# Patient Record
Sex: FEMALE | Race: WHITE
[De-identification: names, ages, dates, MRNs, and addresses within clinical notes are randomized per-mention and may not be internally consistent; named-entity substitution may affect disease eponyms.]

---

## 2019-07-11 ENCOUNTER — HOSPITAL ENCOUNTER (EMERGENCY)
Dept: HOSPITAL 62 - ER | Age: 29
Discharge: HOME | End: 2019-07-11
Payer: COMMERCIAL

## 2019-07-11 VITALS — SYSTOLIC BLOOD PRESSURE: 135 MMHG | DIASTOLIC BLOOD PRESSURE: 78 MMHG

## 2019-07-11 DIAGNOSIS — R06.02: Primary | ICD-10-CM

## 2019-07-11 DIAGNOSIS — Z79.3: ICD-10-CM

## 2019-07-11 LAB
ADD MANUAL DIFF: NO
ALBUMIN SERPL-MCNC: 4.8 G/DL (ref 3.5–5)
ALP SERPL-CCNC: 60 U/L (ref 38–126)
ALT SERPL-CCNC: 19 U/L (ref 9–52)
ANION GAP SERPL CALC-SCNC: 9 MMOL/L (ref 5–19)
APPEARANCE UR: CLEAR
APTT PPP: YELLOW S
AST SERPL-CCNC: 23 U/L (ref 14–36)
BASOPHILS # BLD AUTO: 0 10^3/UL (ref 0–0.2)
BASOPHILS NFR BLD AUTO: 0.4 % (ref 0–2)
BILIRUB DIRECT SERPL-MCNC: 0.2 MG/DL (ref 0–0.4)
BILIRUB SERPL-MCNC: 0.5 MG/DL (ref 0.2–1.3)
BILIRUB UR QL STRIP: NEGATIVE
BUN SERPL-MCNC: 11 MG/DL (ref 7–20)
CALCIUM: 9.7 MG/DL (ref 8.4–10.2)
CHLORIDE SERPL-SCNC: 103 MMOL/L (ref 98–107)
CK MB SERPL-MCNC: < 0.22 NG/ML (ref ?–4.55)
CK SERPL-CCNC: 42 U/L (ref 30–135)
CO2 SERPL-SCNC: 28 MMOL/L (ref 22–30)
EOSINOPHIL # BLD AUTO: 0.1 10^3/UL (ref 0–0.6)
EOSINOPHIL NFR BLD AUTO: 0.9 % (ref 0–6)
ERYTHROCYTE [DISTWIDTH] IN BLOOD BY AUTOMATED COUNT: 12.8 % (ref 11.5–14)
GLUCOSE SERPL-MCNC: 93 MG/DL (ref 75–110)
GLUCOSE UR STRIP-MCNC: NEGATIVE MG/DL
HCT VFR BLD CALC: 40.4 % (ref 36–47)
HGB BLD-MCNC: 13.7 G/DL (ref 12–15.5)
KETONES UR STRIP-MCNC: NEGATIVE MG/DL
LIPASE SERPL-CCNC: 49.3 U/L (ref 23–300)
LYMPHOCYTES # BLD AUTO: 1 10^3/UL (ref 0.5–4.7)
LYMPHOCYTES NFR BLD AUTO: 13.6 % (ref 13–45)
MCH RBC QN AUTO: 29 PG (ref 27–33.4)
MCHC RBC AUTO-ENTMCNC: 34 G/DL (ref 32–36)
MCV RBC AUTO: 85 FL (ref 80–97)
MONOCYTES # BLD AUTO: 0.4 10^3/UL (ref 0.1–1.4)
MONOCYTES NFR BLD AUTO: 5.7 % (ref 3–13)
NEUTROPHILS # BLD AUTO: 5.8 10^3/UL (ref 1.7–8.2)
NEUTS SEG NFR BLD AUTO: 79.4 % (ref 42–78)
NITRITE UR QL STRIP: NEGATIVE
PH UR STRIP: 5 [PH] (ref 5–9)
PLATELET # BLD: 196 10^3/UL (ref 150–450)
POTASSIUM SERPL-SCNC: 4 MMOL/L (ref 3.6–5)
PROT SERPL-MCNC: 8.2 G/DL (ref 6.3–8.2)
PROT UR STRIP-MCNC: NEGATIVE MG/DL
RBC # BLD AUTO: 4.73 10^6/UL (ref 3.72–5.28)
SODIUM SERPL-SCNC: 140.3 MMOL/L (ref 137–145)
SP GR UR STRIP: 1.01
TOTAL CELLS COUNTED % (AUTO): 100 %
TROPONIN I SERPL-MCNC: < 0.012 NG/ML
UROBILINOGEN UR-MCNC: NEGATIVE MG/DL (ref ?–2)
WBC # BLD AUTO: 7.3 10^3/UL (ref 4–10.5)

## 2019-07-11 PROCEDURE — 71046 X-RAY EXAM CHEST 2 VIEWS: CPT

## 2019-07-11 PROCEDURE — 80053 COMPREHEN METABOLIC PANEL: CPT

## 2019-07-11 PROCEDURE — 99285 EMERGENCY DEPT VISIT HI MDM: CPT

## 2019-07-11 PROCEDURE — 84702 CHORIONIC GONADOTROPIN TEST: CPT

## 2019-07-11 PROCEDURE — 82553 CREATINE MB FRACTION: CPT

## 2019-07-11 PROCEDURE — 81001 URINALYSIS AUTO W/SCOPE: CPT

## 2019-07-11 PROCEDURE — 93010 ELECTROCARDIOGRAM REPORT: CPT

## 2019-07-11 PROCEDURE — 85025 COMPLETE CBC W/AUTO DIFF WBC: CPT

## 2019-07-11 PROCEDURE — 36415 COLL VENOUS BLD VENIPUNCTURE: CPT

## 2019-07-11 PROCEDURE — 93005 ELECTROCARDIOGRAM TRACING: CPT

## 2019-07-11 PROCEDURE — 82550 ASSAY OF CK (CPK): CPT

## 2019-07-11 PROCEDURE — 71275 CT ANGIOGRAPHY CHEST: CPT

## 2019-07-11 PROCEDURE — 84484 ASSAY OF TROPONIN QUANT: CPT

## 2019-07-11 PROCEDURE — 83690 ASSAY OF LIPASE: CPT

## 2019-07-11 NOTE — ER DOCUMENT REPORT
ED Respiratory Problem





- General


Chief Complaint: Shortness Of Breath


Stated Complaint: SHORTNESS OF BREATH


Time Seen by Provider: 07/11/19 13:48


Mode of Arrival: Ambulatory


Notes: 





Patient is otherwise healthy 28-year-old female presents to the emergency 

department for generalized respiratory distress.  Patient states approximately 

May 23 of this year patient was started on oral contraceptive birth control.  

States shortly after starting oral contraceptive birth control she developed 

intermittent shortness of breath.  Patient states "it comes on in waves."  

Patient states she was seen at urgent care today and told to come to the 

emergency department for further evaluation.  Patient's denying any cough, 

congestion, fevers, chest pain.


 patient states currently she feels as though "I cannot take a deep breath."  

Patient's denying any trauma or injury to her chest


, Back, abdomen.


TRAVEL OUTSIDE OF THE [place] IN LAST 30 DAYS: No





- Related Data


Allergies/Adverse Reactions: 


                                        





No Known Allergies Allergy (Unverified 07/11/19 13:43)


   











Past Medical History





- General


Information source: Patient





- Social History


Smoking Status: Never Smoker


Chew tobacco use (# tins/day): No


Frequency of alcohol use: Social


Drug Abuse: None


Family History: Reviewed & Not Pertinent


Patient has suicidal ideation: No


Patient has homicidal ideation: No


Renal/ Medical History: Denies: Hx Peritoneal Dialysis


Past Surgical History: Reports: Hx Oral Surgery - wisdom teeth removed





Review of Systems





- Review of Systems


Constitutional: denies: Fever


EENT: See HPI


Cardiovascular: See HPI


Respiratory: See HPI


Gastrointestinal: No symptoms reported


Genitourinary: No symptoms reported


Female Genitourinary: No symptoms reported


Musculoskeletal: No symptoms reported


Skin: No symptoms reported


Hematologic/Lymphatic: No symptoms reported


Neurological/Psychological: No symptoms reported





Physical Exam





- Vital signs


Vitals: 


                                        











Temp Pulse Resp BP Pulse Ox


 


 98.2 F   66   18   130/71 H  99 


 


 07/11/19 13:02  07/11/19 13:02  07/11/19 13:02  07/11/19 13:02  07/11/19 13:02














- Notes


Notes: 





GENERAL: Alert, interacts well. No acute distress.


HEAD: Normocephalic, atraumatic.


EYES: Pupils equal, round, and reactive to light. Extraocular movements intact.


ENT: Oral mucosa moist, tongue midline. 


NECK: Full range of motion. Supple. Trachea midline.


LUNGS: Clear to auscultation bilaterally, no wheezes, rales, or rhonchi. No 

respiratory distress.


HEART: Regular rate and rhythm. No murmur


Chest: No crepitus felt, no ecchymosis, erythema noted anterior posterior chest 

wall.


ABDOMEN: Soft, non-tender. Non-distended. Bowel sounds present in all 4 

quadrants.


EXTREMITIES: Moves all 4 extremities spontaneously. No edema, normal radial and 

dorsalis pedis pulses bilaterally. No cyanosis.


BACK: no cervical, thoracic, lumbar midline tenderness. No saddle anesthesia, 

normal distal neurovascular exam. 


NEUROLOGICAL: Alert and oriented x3. Normal speech. cranial nerves II through 

XII grossly intact 


PSYCH: Normal affect, normal mood.


SKIN: Warm, dry, normal turgor. No rashes or lesions noted.








Course





- Re-evaluation


Re-evalutation: 


Patient's initial work-up ordered by RME provider shows no signs of obvious 

abnormalities.  Based on patient recently starting oral contraceptives pulmonary

embolus cannot be ruled out without a CTA.  I have discussed


A CTA with patient at length at bedside.  Patient wishes to proceed with test.








Laboratory











  07/11/19 07/11/19 07/11/19





  13:57 13:57 13:57


 


WBC  7.3  


 


RBC  4.73  


 


Hgb  13.7  


 


Hct  40.4  


 


MCV  85  


 


MCH  29.0  


 


MCHC  34.0  


 


RDW  12.8  


 


Plt Count  196  


 


Seg Neutrophils %  79.4 H  


 


Lymphocytes %  13.6  


 


Monocytes %  5.7  


 


Eosinophils %  0.9  


 


Basophils %  0.4  


 


Absolute Neutrophils  5.8  


 


Absolute Lymphocytes  1.0  


 


Absolute Monocytes  0.4  


 


Absolute Eosinophils  0.1  


 


Absolute Basophils  0.0  


 


Sodium   140.3 


 


Potassium   4.0 


 


Chloride   103 


 


Carbon Dioxide   28 


 


Anion Gap   9 


 


BUN   11 


 


Creatinine   0.67 


 


Est GFR ( Amer)   > 60 


 


Est GFR (Non-Af Amer)   > 60 


 


Glucose   93 


 


Calcium   9.7 


 


Total Bilirubin   0.5 


 


Direct Bilirubin   0.2 


 


Neonat Total Bilirubin   Not Reportable 


 


Neonat Direct Bilirubin   Not Reportable 


 


Neonat Indirect Bili   Not Reportable 


 


AST   23 


 


ALT   19 


 


Alkaline Phosphatase   60 


 


Creatine Kinase   42 


 


CK-MB (CK-2)    < 0.22


 


Troponin I    < 0.012


 


Total Protein   8.2 


 


Albumin   4.8 


 


Lipase   49.3 


 


Beta HCG, Quant   < 2.39 


 


Total Beta HCG   NEGATIVE 


 


Urine Color   


 


Urine Appearance   


 


Urine pH   


 


Ur Specific Gravity   


 


Urine Protein   


 


Urine Glucose (UA)   


 


Urine Ketones   


 


Urine Blood   


 


Urine Nitrite   


 


Urine Bilirubin   


 


Urine Urobilinogen   


 


Ur Leukocyte Esterase   


 


Urine WBC (Auto)   


 


Urine RBC (Auto)   


 


Squamous Epi Cells Auto   


 


Urine Mucus (Auto)   


 


Urine Ascorbic Acid   














  07/11/19





  13:57


 


WBC 


 


RBC 


 


Hgb 


 


Hct 


 


MCV 


 


MCH 


 


MCHC 


 


RDW 


 


Plt Count 


 


Seg Neutrophils % 


 


Lymphocytes % 


 


Monocytes % 


 


Eosinophils % 


 


Basophils % 


 


Absolute Neutrophils 


 


Absolute Lymphocytes 


 


Absolute Monocytes 


 


Absolute Eosinophils 


 


Absolute Basophils 


 


Sodium 


 


Potassium 


 


Chloride 


 


Carbon Dioxide 


 


Anion Gap 


 


BUN 


 


Creatinine 


 


Est GFR ( Amer) 


 


Est GFR (Non-Af Amer) 


 


Glucose 


 


Calcium 


 


Total Bilirubin 


 


Direct Bilirubin 


 


Neonat Total Bilirubin 


 


Neonat Direct Bilirubin 


 


Neonat Indirect Bili 


 


AST 


 


ALT 


 


Alkaline Phosphatase 


 


Creatine Kinase 


 


CK-MB (CK-2) 


 


Troponin I 


 


Total Protein 


 


Albumin 


 


Lipase 


 


Beta HCG, Quant 


 


Total Beta HCG 


 


Urine Color  YELLOW


 


Urine Appearance  CLEAR


 


Urine pH  5.0


 


Ur Specific Gravity  1.015


 


Urine Protein  NEGATIVE


 


Urine Glucose (UA)  NEGATIVE


 


Urine Ketones  NEGATIVE


 


Urine Blood  NEGATIVE


 


Urine Nitrite  NEGATIVE


 


Urine Bilirubin  NEGATIVE


 


Urine Urobilinogen  NEGATIVE


 


Ur Leukocyte Esterase  NEGATIVE


 


Urine WBC (Auto)  1


 


Urine RBC (Auto)  0


 


Squamous Epi Cells Auto  <1


 


Urine Mucus (Auto)  RARE


 


Urine Ascorbic Acid  NEGATIVE











                                        





Chest X-Ray  07/11/19 13:49


IMPRESSION:  NO ACUTE RADIOGRAPHIC FINDING IN THE CHEST.


 








Chest/Abdomen CTA  07/11/19 16:52


IMPRESSION:  No emboli visualized in the main pulmonary arteries or the segme

ntal branches.


 








Patient's labs show no signs of leukocytosis, no signs of anemia, patient's 

chest x-ray shows no signs of rib fractures, pneumonia, cardiomegaly.  Patient's

CTA shows no signs of pulmonary embolus.








I discussed patient's results with her and her  at length at bedside.  I 

discussed close follow-up with primary care provider.  At this time will 

discharge with return precautions and follow-up recommendations.  Verbal 

discharge instructions given a the bedside and opportunity for questions given. 

Medication warnings reviewed. Patient is in agreement with this plan and has 

verbalized understanding of return precautions and the need for primary care 

follow-up in the next 24-72 hours.





This medical record was dictated with voice recognizing software.  There may be 

grammatical, syntax errors that are unintended.








- Vital Signs


Vital signs: 


                                        











Temp Pulse Resp BP Pulse Ox


 


 98.8 F   68   16   119/63   100 


 


 07/11/19 18:52  07/11/19 18:52  07/11/19 18:52  07/11/19 18:52  07/11/19 18:52














- Laboratory


Result Diagrams: 


                                 07/11/19 13:57





                                 07/11/19 13:57


Laboratory results interpreted by me: 


                                        











  07/11/19





  13:57


 


Seg Neutrophils %  79.4 H














Discharge





- Discharge


Clinical Impression: 


 Shortness of breath





Condition: Stable


Disposition: HOME, SELF-CARE


Additional Instructions: 


As we discussed you have been seen and treated in the emergency department for 

your intermittent respiratory distress.  We were able to rule out pneumonia, rib

fractures, pulmonary embolus, anemia.  You should follow-up with your primary 

care provider for  continued testing and care.  Please make sure you stop taking

oral contraceptives.  Please make sure you  use another form of birth control to

prevent pregnancy.  Please also return to the emergency room should you have any

other concerns.

## 2019-07-11 NOTE — RADIOLOGY REPORT (SQ)
EXAM DESCRIPTION:  CTA CHEST



COMPLETED DATE/TIME:  7/11/2019 6:11 pm



REASON FOR STUDY:  sob, recent start of OC



COMPARISON:  None.



TECHNIQUE:  CT scan of the chest performed using helical scanning technique with dynamic intravenous 
contrast injection.  Images reviewed with lung, soft tissue and bone windows.  Reconstructed coronal 
and sagittal MPR images reviewed.

Additional 3 dimensional post-processing perform===ed to develop Maximal Intensity Projection images 
(MIP).  All images stored on PACS.

All CT scanners at this facility use dose modulation, iterative reconstruction, and/or weight based d
osing when appropriate to reduce radiation dose to as low as reasonably achievable (ALARA).

[facility]: Dose Right  [facility]: CareDose    [facility]: Dose Right    [facility]: Teradose 4D    [facility]: Smart Technologies



CONTRAST TYPE AND DOSE:  contrast/concentration: Isovue 350.00 mg/ml; Total Contrast Delivered: 74.0 
ml; Total Saline Delivered: 110.0 ml

Contrast bolus optimized for the pulmonary arteries. Not diagnostic for the aorta.



RENAL FUNCTION:  GFR > 60.



RADIATION DOSE:  CT Rad equipment meets quality standard of care and radiation dose reduction techniq
ues were employed. CTDIvol: 11.0 - 30.0 mGy. DLP: 456 mGy-cm. .



LIMITATIONS:  None.



FINDINGS:  LUNGS AND PLEURA: No masses, infiltrates, or pneumothorax.  No pleural effusions or pleura
l calcifications.

AORTA AND GREAT VESSELS: No aneurysm.  Contrast bolus not optimized for the aorta.

HEART: No pericardial effusion. No significant coronary artery calcifications.

PULMONARY ARTERIES: No emboli visualized in the main pulmonary arteries or the segmental branches.

HILAR AND MEDIASTINAL STRUCTURES: No identified masses or abnormal nodes.

HARDWARE: None in the chest.

UPPER ABDOMEN: No significant findings.  Limited exam.

THYROID AND OTHER SOFT TISSUES: No masses.  No adenopathy.

BONES: No acute or significant finding.

3D MIPS: Confirm above findings.

OTHER: No other significant finding.



IMPRESSION:  No emboli visualized in the main pulmonary arteries or the segmental branches.



COMMENT:  Quality ID # [id number]: Final reports with documentation of one or more dose reduction techniques
 (e.g., Automated exposure control, adjustment of the mA and/or kV according to patient size, use of 
iterative reconstruction technique)



TECHNICAL DOCUMENTATION:  JOB ID:  [id number]

[id number]

 2011 [employer]- All Rights Reserved



Reading location - IP/workstation name: [email or web address]

## 2019-07-11 NOTE — ER DOCUMENT REPORT
ED Medical Screen (RME)





- General


Chief Complaint: Shortness Of Breath


Stated Complaint: SHORTNESS OF BREATH


Time Seen by Provider: 07/11/19 13:48


Mode of Arrival: Ambulatory


Information source: Patient


Notes: 





28-year-old female presented to ED for complaint of shortness of breath on and 

off lasting longer and longer is times gone past.  She states this lasted about 

2 months.  She states she started new month birth control pill 2 months ago.  

She states she has not taken it much recently and she and her  been using

condoms.  She went to the urgent care and they sent over here to find it was 

going on.











I have greeted and performed a rapid initial assessment of this patient.  A 

comprehensive ED assessment and evaluation of the patient, analysis of test 

results and completion of medical decision making process will be conducted by 

an additional ED providers.











Dictation of this chart was performed using voice recognition software; 

therefore, there may be some unintended grammatical errors.


TRAVEL OUTSIDE OF THE [place] IN LAST 30 DAYS: No





- Related Data


Allergies/Adverse Reactions: 


                                        





No Known Allergies Allergy (Unverified 07/11/19 13:43)


   











Past Medical History





- Social History


Chew tobacco use (# tins/day): No


Frequency of alcohol use: Social


Drug Abuse: None


Renal/ Medical History: Denies: Hx Peritoneal Dialysis


Past Surgical History: Reports: Hx Oral Surgery - wisdom teeth removed





Physical Exam





- Vital signs


Vitals: 





                                        











Temp Pulse Resp BP Pulse Ox


 


 98.2 F   66   18   130/71 H  99 


 


 07/11/19 13:02  07/11/19 13:02  07/11/19 13:02  07/11/19 13:02  07/11/19 13:02














Course





- Vital Signs


Vital signs: 





                                        











Temp Pulse Resp BP Pulse Ox


 


 98.2 F   66   18   130/71 H  99 


 


 07/11/19 13:02  07/11/19 13:02  07/11/19 13:02  07/11/19 13:02  07/11/19 13:02

## 2019-07-11 NOTE — RADIOLOGY REPORT (SQ)
EXAM DESCRIPTION:  CHEST 2 VIEWS



COMPLETED DATE/TIME:  7/11/2019 2:07 pm



REASON FOR STUDY:  Short of breath off and on for 2 months now



COMPARISON:  None.



EXAM PARAMETERS:  NUMBER OF VIEWS: two views

TECHNIQUE: Digital Frontal and Lateral radiographic views of the chest acquired.

RADIATION DOSE: NA

LIMITATIONS: none



FINDINGS:  LUNGS AND PLEURA: No opacities, masses or pneumothorax. No pleural effusion.

MEDIASTINUM AND HILAR STRUCTURES: No masses or contour abnormalities.

HEART AND VASCULAR STRUCTURES: Heart normal size.  No evidence for failure.

BONES: No acute findings.

HARDWARE: None in the chest.

OTHER: No other significant finding.



IMPRESSION:  NO ACUTE RADIOGRAPHIC FINDING IN THE CHEST.



TECHNICAL DOCUMENTATION:  JOB ID:  [id number]

 2011 [employer]- All Rights Reserved



Reading location - IP/workstation name: [email or web address]